# Patient Record
Sex: MALE | Race: WHITE | NOT HISPANIC OR LATINO | Employment: STUDENT | URBAN - METROPOLITAN AREA
[De-identification: names, ages, dates, MRNs, and addresses within clinical notes are randomized per-mention and may not be internally consistent; named-entity substitution may affect disease eponyms.]

---
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
## 2023-10-08 ENCOUNTER — HOSPITAL ENCOUNTER (EMERGENCY)
Facility: HOSPITAL | Age: 23
Discharge: HOME | End: 2023-10-08
Attending: EMERGENCY MEDICINE
Payer: COMMERCIAL

## 2023-10-08 VITALS
RESPIRATION RATE: 14 BRPM | SYSTOLIC BLOOD PRESSURE: 118 MMHG | DIASTOLIC BLOOD PRESSURE: 58 MMHG | HEART RATE: 94 BPM | TEMPERATURE: 98.1 F | OXYGEN SATURATION: 94 %

## 2023-10-08 DIAGNOSIS — F10.929 ALCOHOL INTOXICATION (HCC): Primary | ICD-10-CM

## 2023-10-08 PROCEDURE — 99283 EMERGENCY DEPT VISIT LOW MDM: CPT

## 2023-10-08 PROCEDURE — 99284 EMERGENCY DEPT VISIT MOD MDM: CPT | Performed by: EMERGENCY MEDICINE

## 2023-10-08 RX ORDER — ONDANSETRON 4 MG/1
4 TABLET, ORALLY DISINTEGRATING ORAL ONCE
Status: DISCONTINUED | OUTPATIENT
Start: 2023-10-08 | End: 2023-10-08 | Stop reason: HOSPADM

## 2023-10-08 NOTE — ED ATTENDING ATTESTATION
10/8/2023  IRoyal Alison MD, saw and evaluated the patient. I have discussed the patient with the resident/non-physician practitioner and agree with the resident's/non-physician practitioner's findings, Plan of Care, and MDM as documented in the resident's/non-physician practitioner's note, except where noted. All available labs and Radiology studies were reviewed. I was present for key portions of any procedure(s) performed by the resident/non-physician practitioner and I was immediately available to provide assistance. At this point I agree with the current assessment done in the Emergency Department. I have conducted an independent evaluation of this patient a history and physical is as follows:    73-year-old male presents the emergency department via EMS for evaluation of acute alcohol intoxication. Patient also endorsed to EMS that he used cocaine. When he arrived, EMS was reportedly able to assist him to the bathroom where he was able to pee, he is now back in bed and is sleeping. He is able to be aroused with sternal rub and answers basic questions. He denies any trauma or falls. Does endorse some nausea, no vomiting. Exam, patient was comfortably bed in no acute distress, head is normocephalic atraumatic, pupils equal round reactive to light, neck is supple without meningismus signs, heart is regular rate and rhythm with intact distal pulses, no increased work of breathing, respiratory distress, or stridor. Full range of motion of all extremities. Exam consistent with acute alcohol intoxication, no outward signs of trauma, do not believe any further work-up is necessary at this time, will monitor until sober.     ED Course         Critical Care Time  Procedures

## 2023-10-08 NOTE — DISCHARGE INSTRUCTIONS
You were seen and evaluated in the emergency department for alcohol intoxication. Monitored and observed until clinically sober. Follow up with PCP as needed.

## 2023-10-08 NOTE — ED PROVIDER NOTES
History  Chief Complaint   Patient presents with   • Alcohol Intoxication     Pt brought in by EMS. Pt is from [facility] pt told EMS he drank a whole lot and did some coke. Patient is a 51-year-old male presenting via EMS for evaluation of alcohol intoxication also endorsed EMS that he used some cocaine prior to arrival.  History limited secondary to patient's intoxication          None       History reviewed. No pertinent past medical history. History reviewed. No pertinent surgical history. History reviewed. No pertinent family history. I have reviewed and agree with the history as documented. E-Cigarette/Vaping     E-Cigarette/Vaping Substances     Social History     Tobacco Use   • Smoking status: Never   Substance Use Topics   • Alcohol use: Yes     Comment: socially   • Drug use: Not Currently        Review of Systems   Unable to perform ROS: Other (Intoxicated)       Physical Exam  ED Triage Vitals [10/08/23 0421]   Temperature Pulse Respirations Blood Pressure SpO2   98.1 °F (36.7 °C) 99 16 135/61 100 %      Temp Source Heart Rate Source Patient Position - Orthostatic VS BP Location FiO2 (%)   Tympanic Monitor Lying Right arm --      Pain Score       --             Orthostatic Vital Signs  Vitals:    10/08/23 0421 10/08/23 0504 10/08/23 0604   BP: 135/61  118/58   Pulse: 99 86 94   Patient Position - Orthostatic VS: Lying         Physical Exam  Vitals and nursing note reviewed. Constitutional:       General: He is not in acute distress. Appearance: He is well-developed. HENT:      Head: Normocephalic and atraumatic. Eyes:      Conjunctiva/sclera: Conjunctivae normal.   Cardiovascular:      Rate and Rhythm: Normal rate and regular rhythm. Heart sounds: No murmur heard. Pulmonary:      Effort: Pulmonary effort is normal. No respiratory distress. Breath sounds: Normal breath sounds. Abdominal:      Palpations: Abdomen is soft. Musculoskeletal:         General: No swelling. Cervical back: Neck supple. Skin:     General: Skin is warm and dry. Capillary Refill: Capillary refill takes less than 2 seconds. Neurological:      Mental Status: He is alert. Comments: Patient sedated secondary to intoxication however will move extremities open eyes to sternal rub. Able to give thumbs up when asked if okay. Clinically intoxicated         ED Medications  Medications   ondansetron (ZOFRAN-ODT) dispersible tablet 4 mg (0 mg Oral Hold 10/8/23 9053)       Diagnostic Studies  Results Reviewed     None                 No orders to display         Procedures  Procedures      ED Course  ED Course as of 10/08/23 0644   [facility] Oct 08, 2023   0444 Intoxicated - per ems EtOH and cocaine. Not hypoxic heart rrr lungs clear no signs of trauma - will observe until clinically sober then can d/c                                       Medical Decision Making  Patient is a 21-year-old male presenting via EMS for evaluation of intoxication    See ED course    Will observe until clinically sober. Patient signed out pending reevaluation and becoming clinically sober prior to discharge. Hemodynamically stable at time of signout          Disposition  Final diagnoses:   Alcohol intoxication ([address])     Time reflects when diagnosis was documented in both MDM as applicable and the Disposition within this note     Time User Action Codes Description Comment    10/8/2023  4:23 AM Magdaleno Aviles Artist Add [U49.444] Alcohol intoxication [facility])       ED Disposition     None      Follow-up Information    None         Patient's Medications    No medications on file     No discharge procedures on file. PDMP Review     None           ED Provider  Attending physically available and evaluated Juliano Fenton. I managed the patient along with the ED Attending.     Electronically Signed by         Renato Trevizo DO  10/08/23 7190